# Patient Record
Sex: FEMALE | Race: WHITE | NOT HISPANIC OR LATINO | URBAN - METROPOLITAN AREA
[De-identification: names, ages, dates, MRNs, and addresses within clinical notes are randomized per-mention and may not be internally consistent; named-entity substitution may affect disease eponyms.]

---

## 2017-04-12 ENCOUNTER — EMERGENCY (EMERGENCY)
Facility: HOSPITAL | Age: 67
LOS: 1 days | Discharge: HOME | End: 2017-04-12
Attending: EMERGENCY MEDICINE | Admitting: EMERGENCY MEDICINE
Payer: MEDICARE

## 2017-04-12 VITALS
HEART RATE: 86 BPM | DIASTOLIC BLOOD PRESSURE: 80 MMHG | SYSTOLIC BLOOD PRESSURE: 133 MMHG | OXYGEN SATURATION: 97 % | TEMPERATURE: 98 F | RESPIRATION RATE: 18 BRPM

## 2017-04-12 VITALS
RESPIRATION RATE: 20 BRPM | TEMPERATURE: 98 F | DIASTOLIC BLOOD PRESSURE: 83 MMHG | HEART RATE: 87 BPM | SYSTOLIC BLOOD PRESSURE: 145 MMHG | OXYGEN SATURATION: 96 %

## 2017-04-12 DIAGNOSIS — M25.562 PAIN IN LEFT KNEE: ICD-10-CM

## 2017-04-12 PROCEDURE — 73564 X-RAY EXAM KNEE 4 OR MORE: CPT

## 2017-04-12 PROCEDURE — 93971 EXTREMITY STUDY: CPT | Mod: 26

## 2017-04-12 PROCEDURE — 93971 EXTREMITY STUDY: CPT

## 2017-04-12 PROCEDURE — 73564 X-RAY EXAM KNEE 4 OR MORE: CPT | Mod: 26,LT

## 2017-04-12 PROCEDURE — 99284 EMERGENCY DEPT VISIT MOD MDM: CPT | Mod: GC

## 2017-04-12 PROCEDURE — 99284 EMERGENCY DEPT VISIT MOD MDM: CPT | Mod: 25

## 2017-04-12 RX ORDER — ACETAMINOPHEN 500 MG
650 TABLET ORAL EVERY 6 HOURS
Qty: 0 | Refills: 0 | Status: DISCONTINUED | OUTPATIENT
Start: 2017-04-12 | End: 2017-04-16

## 2017-04-12 RX ADMIN — Medication 650 MILLIGRAM(S): at 11:49

## 2017-04-12 RX ADMIN — Medication 650 MILLIGRAM(S): at 12:18

## 2017-04-12 NOTE — ED PROVIDER NOTE - PHYSICAL EXAMINATION
Attending Stokes: Gen: NAD, heent: atrauamtic, eomi, perrla, mmm, op pink, uvula midline, neck; nttp, no nuchal rigidity, chest: nttp, no crepitus, cv: rrr, no murmurs, lungs: ctab, abd: soft, nontender, nondistended, no peritoneal signs, +BS, no guarding, ext: wwp, neg homans, mild warmth of left knee. able to range knee without difficulty. no redness. trace effusion,skin: no rash, neuro: awake and alert, following commands, speech clear, sensation and strength intact, no focal deficits gait table

## 2017-04-12 NOTE — ED PROVIDER NOTE - PLAN OF CARE
Resolution Please follow-up with your primary care physician within 48hrs.  Please take Tylenol for pain.  Please return to the ED for new or worsening symptoms including marked knee swelling, new calf tenderness/swelling.

## 2017-04-12 NOTE — ED PROVIDER NOTE - CARE PLAN
Principal Discharge DX:	Baker's cyst of knee, left  Goal:	Resolution  Instructions for follow-up, activity and diet:	Please follow-up with your primary care physician within 48hrs.  Please take Tylenol for pain.  Please return to the ED for new or worsening symptoms including marked knee swelling, new calf tenderness/swelling.

## 2017-04-12 NOTE — ED PROVIDER NOTE - PROGRESS NOTE DETAILS
Attending Kike: POCUS shows likely large bakers cys tand trace knee effusion. offered arthrocentesis. placed local anesthetic for arthrocentesis however pt did not want any further shots. pt request fluid drained. explained to pt that in order to drain fluid will need to use a needle. pt does not want a needle to drain the fluid but wants it drained. explained to pt that no other way to drain it. pt understands and prefers no needles Attending Kike: pt ambulating without difficulty. knee ace wrapped. pt given ortho follow up

## 2017-04-12 NOTE — ED ADULT NURSE NOTE - OBJECTIVE STATEMENT
66 yr old female PMHx HTN and hypothyroid comes in today with c/o left knee pain worse with ambulation and tightness around knee x 1 month, 6/10 with walking. Patient had accupunture done 3 weeks ago and it improved but now it is starting to act up again. she took motrin with relief but her daughter told her to stop taking it because it can cause GI bleeds, so she started to take tylenol but that does not help as much. denies fevers, chills, calf pain, smoking h/o, numbness or tingling. mild warmth to left knee, no erythema, swelling, or drainage noted.  + rom of leg and knee. safety and comfort maintained.

## 2018-05-10 RX ORDER — LEVOTHYROXINE SODIUM 125 MCG
0 TABLET ORAL
Qty: 0 | Refills: 0 | COMMUNITY

## 2018-05-10 RX ORDER — LOSARTAN POTASSIUM 100 MG/1
0 TABLET, FILM COATED ORAL
Qty: 0 | Refills: 0 | COMMUNITY

## 2018-10-09 NOTE — ED PROVIDER NOTE - MEDICAL DECISION MAKING DETAILS
Name band; 67 yo F p/w 1 month of left knee pain worse with exertion relieved with Tylenol  -LLE US r/o DVT 67 yo F p/w 1 month of left knee pain worse with exertion relieved with Tylenol  -LLE US r/o DVT v. cyst 67 yo F p/w 1 month of left knee pain worse with exertion relieved with Tylenol  -LLE US r/o DVT v. cyst  Attending Kike: 65 y/o female presenting with worsening left knee pain over last few weeks. on exam pt able to range knee without erythema making septic joint unlikely. POCUS shows left bakers cyst and trace effusion. suspect pain likely secondary to arthritis vs cyst. will check xray, recommend elevation and tylenol. given precautions to return for any fevers, redness or worsening swelling

## 2019-02-09 PROBLEM — E03.9 HYPOTHYROIDISM, UNSPECIFIED: Chronic | Status: ACTIVE | Noted: 2017-04-12

## 2019-02-09 PROBLEM — I10 ESSENTIAL (PRIMARY) HYPERTENSION: Chronic | Status: ACTIVE | Noted: 2017-04-12

## 2022-08-08 NOTE — ED PROVIDER NOTE - PSH
LOV 8/3/2022, Dr Bauer  verapamil (CALAN SR) 180 MG CR tablet 180 tablet 1 4/4/2022     Sig: TAKE 2 Tablets BY MOUTH 1 TIME A DAY      Rx e-prescribed.  
No significant past surgical history

## 2025-01-16 ENCOUNTER — NON-APPOINTMENT (OUTPATIENT)
Age: 75
End: 2025-01-16

## 2025-01-16 ENCOUNTER — APPOINTMENT (OUTPATIENT)
Dept: OPHTHALMOLOGY | Facility: CLINIC | Age: 75
End: 2025-01-16
Payer: MEDICARE

## 2025-01-16 PROCEDURE — 92020 GONIOSCOPY: CPT

## 2025-01-16 PROCEDURE — 92004 COMPRE OPH EXAM NEW PT 1/>: CPT | Mod: 25

## 2025-01-16 PROCEDURE — 76512 OPH US DX B-SCAN: CPT | Mod: LT

## 2025-01-28 ENCOUNTER — APPOINTMENT (OUTPATIENT)
Dept: OPHTHALMOLOGY | Facility: CLINIC | Age: 75
End: 2025-01-28
Payer: MEDICARE

## 2025-01-28 ENCOUNTER — NON-APPOINTMENT (OUTPATIENT)
Age: 75
End: 2025-01-28

## 2025-01-28 PROCEDURE — 92136 OPHTHALMIC BIOMETRY: CPT

## 2025-01-28 PROCEDURE — 92012 INTRM OPH EXAM EST PATIENT: CPT | Mod: 25

## 2025-02-12 ENCOUNTER — APPOINTMENT (OUTPATIENT)
Dept: OPHTHALMOLOGY | Facility: EYE CENTER | Age: 75
End: 2025-02-12
Payer: MEDICARE

## 2025-02-12 ENCOUNTER — NON-APPOINTMENT (OUTPATIENT)
Age: 75
End: 2025-02-12

## 2025-02-12 PROCEDURE — 66982 XCAPSL CTRC RMVL CPLX WO ECP: CPT | Mod: LT

## 2025-02-13 ENCOUNTER — APPOINTMENT (OUTPATIENT)
Dept: OPHTHALMOLOGY | Facility: CLINIC | Age: 75
End: 2025-02-13
Payer: MEDICARE

## 2025-02-13 ENCOUNTER — NON-APPOINTMENT (OUTPATIENT)
Age: 75
End: 2025-02-13

## 2025-02-13 PROCEDURE — 99024 POSTOP FOLLOW-UP VISIT: CPT

## 2025-02-19 ENCOUNTER — APPOINTMENT (OUTPATIENT)
Age: 75
End: 2025-02-19

## 2025-02-20 ENCOUNTER — NON-APPOINTMENT (OUTPATIENT)
Age: 75
End: 2025-02-20

## 2025-02-20 ENCOUNTER — APPOINTMENT (OUTPATIENT)
Dept: OPHTHALMOLOGY | Facility: CLINIC | Age: 75
End: 2025-02-20
Payer: MEDICARE

## 2025-02-20 ENCOUNTER — APPOINTMENT (OUTPATIENT)
Dept: OPHTHALMOLOGY | Facility: CLINIC | Age: 75
End: 2025-02-20

## 2025-02-20 PROCEDURE — 99024 POSTOP FOLLOW-UP VISIT: CPT

## 2025-02-26 ENCOUNTER — NON-APPOINTMENT (OUTPATIENT)
Age: 75
End: 2025-02-26

## 2025-02-26 ENCOUNTER — APPOINTMENT (OUTPATIENT)
Dept: OPHTHALMOLOGY | Facility: EYE CENTER | Age: 75
End: 2025-02-26
Payer: MEDICARE

## 2025-02-26 PROCEDURE — 66984 XCAPSL CTRC RMVL W/O ECP: CPT | Mod: 79,RT

## 2025-02-27 ENCOUNTER — NON-APPOINTMENT (OUTPATIENT)
Age: 75
End: 2025-02-27

## 2025-02-27 ENCOUNTER — APPOINTMENT (OUTPATIENT)
Dept: OPHTHALMOLOGY | Facility: CLINIC | Age: 75
End: 2025-02-27
Payer: MEDICARE

## 2025-02-27 PROCEDURE — 99024 POSTOP FOLLOW-UP VISIT: CPT

## 2025-03-03 ENCOUNTER — APPOINTMENT (OUTPATIENT)
Dept: OPHTHALMOLOGY | Facility: CLINIC | Age: 75
End: 2025-03-03
Payer: MEDICARE

## 2025-03-03 ENCOUNTER — NON-APPOINTMENT (OUTPATIENT)
Age: 75
End: 2025-03-03

## 2025-03-03 PROCEDURE — 99024 POSTOP FOLLOW-UP VISIT: CPT

## 2025-03-20 NOTE — ED ADULT NURSE NOTE - INTEGUMENTARY WDL
Pt to comply with rx therapeutic diet to prevent fluid retention.  Color consistent with ethnicity/race, warm, dry intact, resilient.

## 2025-03-31 ENCOUNTER — APPOINTMENT (OUTPATIENT)
Dept: OPHTHALMOLOGY | Facility: CLINIC | Age: 75
End: 2025-03-31
Payer: MEDICARE

## 2025-03-31 ENCOUNTER — NON-APPOINTMENT (OUTPATIENT)
Age: 75
End: 2025-03-31

## 2025-03-31 PROCEDURE — 99024 POSTOP FOLLOW-UP VISIT: CPT

## 2025-04-15 ENCOUNTER — NON-APPOINTMENT (OUTPATIENT)
Age: 75
End: 2025-04-15

## 2025-04-15 ENCOUNTER — APPOINTMENT (OUTPATIENT)
Dept: OPHTHALMOLOGY | Facility: CLINIC | Age: 75
End: 2025-04-15
Payer: MEDICARE

## 2025-04-15 PROCEDURE — 99024 POSTOP FOLLOW-UP VISIT: CPT

## 2025-05-01 ENCOUNTER — APPOINTMENT (OUTPATIENT)
Dept: OPHTHALMOLOGY | Facility: CLINIC | Age: 75
End: 2025-05-01
Payer: MEDICARE

## 2025-05-01 ENCOUNTER — NON-APPOINTMENT (OUTPATIENT)
Age: 75
End: 2025-05-01

## 2025-05-01 PROCEDURE — 99024 POSTOP FOLLOW-UP VISIT: CPT

## 2025-06-12 ENCOUNTER — APPOINTMENT (OUTPATIENT)
Dept: OPHTHALMOLOGY | Facility: CLINIC | Age: 75
End: 2025-06-12

## 2025-06-12 ENCOUNTER — NON-APPOINTMENT (OUTPATIENT)
Age: 75
End: 2025-06-12

## 2025-06-12 ENCOUNTER — APPOINTMENT (OUTPATIENT)
Dept: OPHTHALMOLOGY | Facility: CLINIC | Age: 75
End: 2025-06-12
Payer: MEDICARE

## 2025-06-12 PROCEDURE — 92014 COMPRE OPH EXAM EST PT 1/>: CPT

## 2025-06-12 PROCEDURE — 92286 ANT SGM IMG I&R SPECLR MIC: CPT

## 2025-06-23 ENCOUNTER — APPOINTMENT (OUTPATIENT)
Dept: OPHTHALMOLOGY | Facility: CLINIC | Age: 75
End: 2025-06-23

## 2025-08-14 ENCOUNTER — APPOINTMENT (OUTPATIENT)
Dept: OPHTHALMOLOGY | Facility: CLINIC | Age: 75
End: 2025-08-14
Payer: MEDICARE

## 2025-08-14 ENCOUNTER — NON-APPOINTMENT (OUTPATIENT)
Age: 75
End: 2025-08-14

## 2025-08-14 PROCEDURE — 92012 INTRM OPH EXAM EST PATIENT: CPT

## 2025-08-14 PROCEDURE — 92201 OPSCPY EXTND RTA DRAW UNI/BI: CPT

## 2025-09-17 ENCOUNTER — APPOINTMENT (OUTPATIENT)
Dept: OPHTHALMOLOGY | Facility: CLINIC | Age: 75
End: 2025-09-17
Payer: MEDICARE

## 2025-09-17 ENCOUNTER — NON-APPOINTMENT (OUTPATIENT)
Age: 75
End: 2025-09-17

## 2025-09-17 PROCEDURE — 92250 FUNDUS PHOTOGRAPHY W/I&R: CPT

## 2025-09-17 PROCEDURE — 92014 COMPRE OPH EXAM EST PT 1/>: CPT
